# Patient Record
Sex: MALE | Race: BLACK OR AFRICAN AMERICAN | NOT HISPANIC OR LATINO | Employment: OTHER | URBAN - METROPOLITAN AREA
[De-identification: names, ages, dates, MRNs, and addresses within clinical notes are randomized per-mention and may not be internally consistent; named-entity substitution may affect disease eponyms.]

---

## 2020-10-22 ENCOUNTER — OFFICE VISIT (OUTPATIENT)
Dept: CARDIOLOGY | Facility: CLINIC | Age: 80
End: 2020-10-22

## 2020-10-22 VITALS
DIASTOLIC BLOOD PRESSURE: 82 MMHG | WEIGHT: 157 LBS | SYSTOLIC BLOOD PRESSURE: 136 MMHG | OXYGEN SATURATION: 98 % | HEIGHT: 63 IN | HEART RATE: 53 BPM | RESPIRATION RATE: 16 BRPM | BODY MASS INDEX: 27.82 KG/M2

## 2020-10-22 DIAGNOSIS — I10 ESSENTIAL HYPERTENSION: Primary | ICD-10-CM

## 2020-10-22 PROCEDURE — 93000 ELECTROCARDIOGRAM COMPLETE: CPT | Performed by: INTERNAL MEDICINE

## 2020-10-22 PROCEDURE — 99203 OFFICE O/P NEW LOW 30 MIN: CPT | Performed by: INTERNAL MEDICINE

## 2020-10-22 RX ORDER — AMLODIPINE BESYLATE AND BENAZEPRIL HYDROCHLORIDE 10; 20 MG/1; MG/1
1 CAPSULE ORAL DAILY
COMMUNITY

## 2020-10-22 RX ORDER — MYCOPHENOLATE MOFETIL 500 MG/1
500 TABLET ORAL 2 TIMES DAILY
COMMUNITY

## 2020-10-22 RX ORDER — CALCIUM CARBONATE 200(500)MG
1 TABLET,CHEWABLE ORAL DAILY
COMMUNITY

## 2020-10-22 RX ORDER — METOPROLOL SUCCINATE 50 MG/1
50 TABLET, EXTENDED RELEASE ORAL DAILY
COMMUNITY

## 2020-10-22 RX ORDER — SIROLIMUS 1 MG/1
2 TABLET, FILM COATED ORAL DAILY
COMMUNITY

## 2020-10-22 NOTE — PROGRESS NOTES
[facility]      Patient Name: Montez Dominguez  :1940  Age: 80 y.o.  Encounter Provider:  Pollo Martin Jr, MD      Chief Complaint:   Chief Complaint   Patient presents with   • Possible Proximal Stenosis         HPI  Montez Dominguez is a 80 y.o. male with past medical history of hypertension and end-stage renal disease status post renal transplant on immunosuppression who presents for initial evaluation.  The referral was placed by an optometrist at [facility] Dr. Villanueva but the reason for consult is uncertain.  The patient states that he had ultrasound carotid performed at [facility] and I do not have those records for evaluation.  He was seen by the optometry practice for cataracts.  He states that he had some bleeding noted on retinal exam but feels that that was secondary to cranial trauma.  He states that 3 to 4 weeks ago he fell in the yard from a mechanical fall after tripping.  He did hit his head and was evaluated but did not require any further medical attention.  He states that in the past he has been noted to have an irregular heart rhythm but that there was no further cardiac work-up required he is never been on blood thinners.  He states that about 4 years ago he was told he had a murmur but does not follow with a cardiologist.  No other cardiac work-up done in the past.  He is very active gentleman who tends to all of his own yard work and denies any limiting factors with exertional activity.  He specifically denies angina or heart failure.  No palpitations, dizziness or syncope.  He denies tobacco alcohol or illicit drug use.  Family history reviewed and not pertinent to this clinic visit.      The following portions of the patient's history were reviewed and updated as appropriate: allergies, current medications, past family history, past medical history, past social history, past surgical history and problem list.      Review of Systems   Constitution:  "Positive for malaise/fatigue.   Eyes: Negative.    Cardiovascular: Positive for irregular heartbeat.   Respiratory: Negative.    Endocrine: Positive for cold intolerance.   Hematologic/Lymphatic: Bruises/bleeds easily.   Skin: Negative.    Musculoskeletal: Negative.    Gastrointestinal: Positive for heartburn.   Genitourinary: Negative.    Neurological: Positive for excessive daytime sleepiness and loss of balance.   Psychiatric/Behavioral: Negative.    Allergic/Immunologic: Negative.      ROS was reviewed, updated evaluate necessary.    OBJECTIVE:   Vital Signs  Vitals:    10/22/20 1046   Pulse: 53   Resp: 16   SpO2: 98%     Estimated body mass index is 27.81 kg/m² as calculated from the following:    Height as of this encounter: 160 cm (63\").    Weight as of this encounter: 71.2 kg (157 lb).    Vitals signs reviewed.   Constitutional:       Appearance: Healthy appearance. Not in distress.   Neck:      Vascular: No JVR. JVD normal.   Pulmonary:      Effort: Pulmonary effort is normal.      Breath sounds: Normal breath sounds. No wheezing. No rhonchi. No rales.   Chest:      Chest wall: Not tender to palpatation.   Cardiovascular:      PMI at left midclavicular line. Normal rate. Regular rhythm.      Murmurs: There is no murmur.      No gallop. No click. No rub.   Pulses:     Intact distal pulses.   Edema:     Peripheral edema absent.   Abdominal:      General: Bowel sounds are normal.      Palpations: Abdomen is soft.      Tenderness: There is no abdominal tenderness.   Musculoskeletal: Normal range of motion.         General: No tenderness.   Skin:     General: Skin is warm and dry.   Neurological:      General: No focal deficit present.      Mental Status: Alert and oriented to person, place and time.           ECG 12 Lead    Date/Time: 10/22/2020 11:27 AM  Performed by: Pollo Martin Jr., MD  Authorized by: Pollo Martin Jr., MD   Comparison: not compared with previous ECG   Previous ECG: no previous ECG " available  Rhythm: sinus rhythm  Conduction: 1st degree AV block    Clinical impression: non-specific ECG                  ASSESSMENT:     Hypertension  Retinopathy  Glaucoma  Status post renal transplant on immunosuppression    PLAN OF CARE:     1. Retinopathy -uncertain what the exact pathology is and we have requested records from the optometry office.  The stated reason for referral was proximal stenosis of but of what vessel I am uncertain.  We ordered the records from [facility] to review the ultrasound of bilateral carotids.  If there is a question of cardiac source of embolism we are happy to perform an echocardiogram but I have no other reason to perform testing at this time.  I will await records before making a decision on further diagnostic testing needs.  2. Hypertension -slightly elevated but normal for age.  Sodium restricted diet.  Twice daily blood pressure log.  3. Abnormal EKG -first-degree AV block on beta-blocker.  Patient is asymptomatic we will continue same.  4. Status post renal transplant on immunosuppression -followed by nephrology, Dr. Sexton.    I will review records once available and decide on diagnostic testing needs as well as follow-up needs.             Discharge Medications          Accurate as of October 22, 2020 10:55 AM. If you have any questions, ask your nurse or doctor.            Continue These Medications      Instructions Start Date   amLODIPine-benazepril 10-20 MG per capsule  Commonly known as: LOTREL   1 capsule, Oral, Daily      calcium carbonate 500 MG chewable tablet  Commonly known as: TUMS   1 tablet, Oral, Daily      HYDRALAZINE HCL PO   100 mg, Oral      metoprolol succinate XL 50 MG 24 hr tablet  Commonly known as: TOPROL-XL   50 mg, Oral, Daily      mycophenolate 500 MG tablet  Commonly known as: CELLCEPT   500 mg, Oral, 2 Times Daily      sirolimus 1 MG tablet  Commonly known as: RAPAMUNE   2 mg, Oral, Daily             Thank you for allowing me to  participate in the care of your patient,      Sincerely,   Pollo Martin MD   [facility]  10/22/20  10:55 EDT